# Patient Record
Sex: MALE | Race: WHITE
[De-identification: names, ages, dates, MRNs, and addresses within clinical notes are randomized per-mention and may not be internally consistent; named-entity substitution may affect disease eponyms.]

---

## 2017-01-31 NOTE — EMERGENCY ROOM VISIT NOTE
ED Visit Note


First contact with patient:  01:18


CHIEF COMPLAINT: Altered mental status from  Alcohol overdose





HISTORY OF PRESENT ILLNESS: This 21-year-old male patient presents to the 

emergency department via ambulance for evaluation of altered mental status, 

presumably from alcohol intoxication.  The patient was evidently on a local 

[place] for about one hour.  The patient did not disembark from the bus, and 

the  became concerned.  EMS was contacted, the patient now presents 

to the emergency room for evaluation.  He does admit to drinking alcohol 

tonight.  No drug use or pills.  He does not report pain or injury.





REVIEW OF SYSTEMS: Review of systems was somewhat limited secondary to patient'

s presumed alcohol intoxication status.  Review of systems was performed to the 

best of our ability and reperformed as the patient began to sober up.  All 

other systems were reviewed and are negative.





ALLERGIES: See EMR





MEDICATIONS: See EMR





PMH: No chronic medical disease





SOCIAL HISTORY: Student and lives locally





PHYSICAL EXAM


VITALS: Vitals are noted on the nurse's note and reviewed by myself.  Vital 

signs stable.


GENERAL: White male, who is in no acute distress and resting comfortably. 

Patient is visibly altered and smells of alcohol. 


HEAD: Normocephalic atraumatic.  


EARS: External ear normal. External auditory canals clear, tympanic membranes 

pearly gray without erythema or effusion bilaterally.


EYES: Pupils equal round and reactive to light and accommodation.  Conjunctivae 

without injection, sclerae without icterus.  Extraocular movements intact.  


NOSE: Patent, turbinates without inflammation or discharge.  


MOUTH: Mucous membranes moist.  Tonsils are not enlarged.  Pharynx without 

erythema, blood, vomitus, or exudate.  Uvula midline.  Airway patent.  


NECK: Supple without nuchal rigidity.  No lymphadenopathy.  Cervical spine is 

nontender.  


HEART: Regular rate and rhythm without murmurs gallops or rubs.


LUNGS: Clear to auscultation bilaterally without wheezes, rales or rhonchi.  No 

retractions or accessory muscle use.


ABDOMEN: Positive normal bowel sounds x 4.  Soft, nontender, without masses or 

organomegaly.  No guarding or rebound tenderness.


MUSCULOSKELETAL: No muscle atrophy, erythema, or edema noted.  Gross motor 

function intact to all extremities. 


NEURO: Patient was alert to person but not place or time. They appear with 

altered mental status. 


SKIN: The skin was without rashes, erythema, edema, or bruising. No Tenting of 

the skin.    





EMERGENCY DEPARTMENT COURSE: 


Physical exam and history was performed.  Nursing notes and EMR were reviewed.  

The patient appears to be altered on my examination.  I suspect this is from an 

alcohol overdose.  Conservative care measures  and  aspiration precautions were 

instituted.  The patient was placed on telemetry monitor and watched during the 

patient's stay.  The patient was placed in a prone position. 





Blood work was obtained and was reviewed.  The patient's blood alcohol level 

was 333. This appears to be the primary cause of the altered status. 





Patient was reevaluated multiple times throughout the course of their emergency 

department stay.  Over time the patient did sober up and was able to talk, walk

, and drink fluids without difficulty. The patient was felt stable for 

discharge home. The patient was given alcohol intoxication handouts.  The 

patient was discharged home in stable condition with a sober ride.





Differential diagnosis:


Etiologies such as alcohol intoxication, metabolic, infection, hypoglycemia, 

electrolyte abnormalities, cardiac sources, intracerebral event, toxicologic, 

neurologic, as well as others were entertained.








DIAGNOSIS: Acute alcohol intoxication


Current/Historical Medications


No Active Prescriptions or Reported Meds





Allergies


Coded Allergies:  


     No Known Allergies (Unverified , 1/28/17)





Vital Signs











  Date Time  Temp Pulse Resp B/P Pulse Ox O2 Delivery O2 Flow Rate FiO2


 


1/28/17 06:31  81 16 117/83 97   


 


1/28/17 05:33  111 14  95 Room Air  


 


1/28/17 05:13  68      


 


1/28/17 05:03  88 20  94   


 


1/28/17 04:33  89 19  96   


 


1/28/17 04:03  90 16  98   


 


1/28/17 03:58  84 18 120/66 97 Room Air  


 


1/28/17 03:52    114/53    


 


1/28/17 03:50  83 18  98   


 


1/28/17 03:20  92 20  97   


 


1/28/17 03:00  108 18 97/45 100 Room Air  


 


1/28/17 02:54    97/45    


 


1/28/17 02:50  105 3  93   


 


1/28/17 02:20  88 14  91   


 


1/28/17 02:15  87 16  92 Room Air  


 


1/28/17 01:58    125/103    


 


1/28/17 01:45  87 21  95   


 


1/28/17 01:35  108      


 


1/28/17 01:28    119/83    


 


1/28/17 01:26      Room Air  


 


1/28/17 01:18     96 Room Air  


 


1/28/17 01:18 37.1 115 15 153/86 96 Room Air  











Laboratory Results


1/28/17 01:36

















Test


  1/28/17


01:36


 


Anion Gap


  11.0 mmol/L


(3-11)


 


Est Creatinine Clear Calc


Drug Dose 142.2 ml/min 


 


 


Estimated GFR (


American) 131.9 


 


 


Estimated GFR (Non-


American 113.8 


 


 


BUN/Creatinine Ratio 15.9 (10-20) 


 


Calcium Level


  8.5 mg/dl


(8.5-10.1)


 


Ethyl Alcohol mg/dL


  333.0 mg/dl


(0-3)











Departure Information


Impression





 Primary Impression:  


 Alcohol intoxication





Dispostion


Home / Self-Care





Condition


GOOD





Prescriptions





No Active Prescriptions or Reported Meds





Forms


HOME CARE DOCUMENTATION FORM,                                                 

               IMPORTANT VISIT INFORMATION





Patient Instructions


Alcohol Intoxication - [facility], Replaced by [facility], [facility]: [employer] 

and Alcohol Related Visits





Additional Instructions





You were seen and evaluated today on an emergency basis only.  This is not a 

substitute for, or an effort to provide, complete comprehensive medical care.  

It is not possible to recognize and treat all injuries or illnesses in a single 

emergency department visit. 





Keep well-hydrated.  Small sips of water over a long period of time are better 

tolerated than large amounts at once.





Tylenol 1000 mg every 6 hours as needed for pain (Maximum 3000 mg Tylenol in 24 

hr period).  





Follow up with family doctor as needed.





You are welcome to return to the emergency department anytime with new, 

worsening, or concerning symptoms.

## 2018-02-15 ENCOUNTER — HOSPITAL ENCOUNTER (EMERGENCY)
Dept: HOSPITAL 45 - C.EDB | Age: 23
Discharge: HOME | End: 2018-02-15
Payer: COMMERCIAL

## 2018-02-15 VITALS — HEART RATE: 95 BPM | SYSTOLIC BLOOD PRESSURE: 126 MMHG | DIASTOLIC BLOOD PRESSURE: 84 MMHG | OXYGEN SATURATION: 100 %

## 2018-02-15 VITALS
BODY MASS INDEX: 25.25 KG/M2 | HEIGHT: 75 IN | WEIGHT: 203.05 LBS | WEIGHT: 203.05 LBS | HEIGHT: 75 IN | BODY MASS INDEX: 25.25 KG/M2

## 2018-02-15 VITALS — TEMPERATURE: 98.96 F

## 2018-02-15 DIAGNOSIS — R07.2: Primary | ICD-10-CM

## 2018-02-15 LAB
ALBUMIN SERPL-MCNC: 4.5 GM/DL (ref 3.4–5)
ALP SERPL-CCNC: 85 U/L (ref 45–117)
ALT SERPL-CCNC: 23 U/L (ref 12–78)
AST SERPL-CCNC: 17 U/L (ref 15–37)
BASOPHILS # BLD: 0.02 K/UL (ref 0–0.2)
BASOPHILS NFR BLD: 0.2 %
BUN SERPL-MCNC: 17 MG/DL (ref 7–18)
CALCIUM SERPL-MCNC: 9.2 MG/DL (ref 8.5–10.1)
CO2 SERPL-SCNC: 26 MMOL/L (ref 21–32)
CREAT SERPL-MCNC: 1.08 MG/DL (ref 0.6–1.4)
EOS ABS #: 0.02 K/UL (ref 0–0.5)
EOSINOPHIL NFR BLD AUTO: 214 K/UL (ref 130–400)
GLUCOSE SERPL-MCNC: 94 MG/DL (ref 70–99)
HCT VFR BLD CALC: 42.9 % (ref 42–52)
HGB BLD-MCNC: 15.3 G/DL (ref 14–18)
IG#: 0.03 K/UL (ref 0–0.02)
IMM GRANULOCYTES NFR BLD AUTO: 13.9 %
LYMPHOCYTES # BLD: 1.25 K/UL (ref 1.2–3.4)
MCH RBC QN AUTO: 31.9 PG (ref 25–34)
MCHC RBC AUTO-ENTMCNC: 35.7 G/DL (ref 32–36)
MCV RBC AUTO: 89.4 FL (ref 80–100)
MONO ABS #: 0.82 K/UL (ref 0.11–0.59)
MONOCYTES NFR BLD: 9.1 %
NEUT ABS #: 6.83 K/UL (ref 1.4–6.5)
NEUTROPHILS # BLD AUTO: 0.2 %
NEUTROPHILS NFR BLD AUTO: 76.3 %
PMV BLD AUTO: 9.7 FL (ref 7.4–10.4)
POTASSIUM SERPL-SCNC: 4 MMOL/L (ref 3.5–5.1)
PROT SERPL-MCNC: 8 GM/DL (ref 6.4–8.2)
RED CELL DISTRIBUTION WIDTH CV: 12.3 % (ref 11.5–14.5)
RED CELL DISTRIBUTION WIDTH SD: 40 FL (ref 36.4–46.3)
SODIUM SERPL-SCNC: 137 MMOL/L (ref 136–145)
WBC # BLD AUTO: 8.97 K/UL (ref 4.8–10.8)

## 2018-02-15 NOTE — EMERGENCY ROOM VISIT NOTE
History


First contact with patient:  20:48


Chief Complaint:  CHEST PAIN


Stated Complaint:  CHEST PAINS


Nursing Triage Summary:  


Patient ambulatory to triage with an upright and steady gait, states "I woke up 


this morning and my chest hurt. It hurt to breathe deep. We are traveling to 


[facility] right now. We were passing through here and stopping to get food but my 


chest is hurting really bad. I get a really sharp pain in the center of my 


chest, toward the right, when I take a deep breath. My right shoulder was 


hurting but it isn't right now."











Patient reports traveling in a car for about 3.5 hours so far.





History of Present Illness


The patient is a 22 year old male who presents to the Emergency Room with 

complaints of chest pain that started this morning.  The patient states that he 

developed pressure-like pain in the middle of his chest from the time he woke 

up this morning and has gotten worse.  Pain is worse on deep inspiration with 

intermittent radiation to his right shoulder.


Pain is about a 5/10 at rest and 8/10 on deep inspiration.  Denies any fevers 

or chills, coughing.  Denies any shortness of breath, palpitations, 

lightheadedness or dizziness.  Denies any history of asthma, denies smoking, 

alcohol use or illicit drug use.  No significant family history of heart 

disease or PE/DVT.


He has been traveling to [place] and has been on the road for about 3-1/2 

hours.  Denies any calf tenderness or swelling, denies any long haul travels, 

recent immobilization.


He lifts weights at gym but hasn't changed his regimen recently





   Source of History:  patient


   Symptom Intensity:  moderate


   Quality:  stabbing


   Timing:  intermittent


   Modifying Factors (Worsening):  breathing


   Associated Symptoms:  No fevers, No chills, No headache, No sorethroat, No 

cough, No nausea, No vomiting, No abdominal pain





Review of Systems


See HPI for pertinent positives & negatives. A total of 10 systems reviewed and 

were otherwise negative.





Past Medical/Surgical History


none





Social History


Smoking Status:  Never Smoker





Current/Historical Medications


No Active Prescriptions or Reported Meds





Physical Exam


Vital Signs











  Date Time  Temp Pulse Resp B/P (MAP) Pulse Ox O2 Delivery O2 Flow Rate FiO2


 


2/15/18 21:01  99      


 


2/15/18 20:46  107 18 137/87 100 Room Air  


 


2/15/18 18:16     98   


 


2/15/18 18:12 37.2 100 16 122/61 97 Room Air  











Physical Exam


GENERAL: Patient is in no acute distress.


HEENT: No acute trauma, normocephalic atraumatic, mucous membranes moist, no 

nasal congestion, no scleral icterus.


NECK: No stridor, no adenopathy, no meningismus, trachea is midline.


LUNGS: Clear to auscultation bilaterally, no wheeze, no rhonchi, breath sounds 

equal.


HEART: Without murmurs gallops or rubs, regular rate and rhythm.


ABDOMEN: Soft, nontender, bowel sounds positive, no hernias, no peritonitis.


EXTREMITIES: No cyanosis or edema, full range of motion of all the joints 

without pain or difficulty, no signs for acute trauma.


NEUROLOGIC: Oriented x 3, no acute motor or sensory deficits, no focal weakness.


SKIN: No rash, no jaundice, no diaphoresis.





Medical Decision & Procedures


Laboratory Results


2/15/18 20:51








Red Blood Count 4.80, Mean Corpuscular Volume 89.4, Mean Corpuscular Hemoglobin 

31.9, Mean Corpuscular Hemoglobin Concent 35.7, Mean Platelet Volume 9.7, 

Neutrophils (%) (Auto) 76.3, Lymphocytes (%) (Auto) 13.9, Monocytes (%) (Auto) 

9.1, Eosinophils (%) (Auto) 0.2, Basophils (%) (Auto) 0.2, Neutrophils # (Auto) 

6.83, Lymphocytes # (Auto) 1.25, Monocytes # (Auto) 0.82, Eosinophils # (Auto) 

0.02, Basophils # (Auto) 0.02





2/15/18 20:51

















Test


  2/15/18


20:51 2/15/18


21:14


 


White Blood Count


  8.97 K/uL


(4.8-10.8) 


 


 


Red Blood Count


  4.80 M/uL


(4.7-6.1) 


 


 


Hemoglobin


  15.3 g/dL


(14.0-18.0) 


 


 


Hematocrit 42.9 % (42-52)  


 


Mean Corpuscular Volume


  89.4 fL


() 


 


 


Mean Corpuscular Hemoglobin


  31.9 pg


(25-34) 


 


 


Mean Corpuscular Hemoglobin


Concent 35.7 g/dl


(32-36) 


 


 


Platelet Count


  214 K/uL


(130-400) 


 


 


Mean Platelet Volume


  9.7 fL


(7.4-10.4) 


 


 


Neutrophils (%) (Auto) 76.3 %  


 


Lymphocytes (%) (Auto) 13.9 %  


 


Monocytes (%) (Auto) 9.1 %  


 


Eosinophils (%) (Auto) 0.2 %  


 


Basophils (%) (Auto) 0.2 %  


 


Neutrophils # (Auto)


  6.83 K/uL


(1.4-6.5) 


 


 


Lymphocytes # (Auto)


  1.25 K/uL


(1.2-3.4) 


 


 


Monocytes # (Auto)


  0.82 K/uL


(0.11-0.59) 


 


 


Eosinophils # (Auto)


  0.02 K/uL


(0-0.5) 


 


 


Basophils # (Auto)


  0.02 K/uL


(0-0.2) 


 


 


RDW Standard Deviation


  40.0 fL


(36.4-46.3) 


 


 


RDW Coefficient of Variation


  12.3 %


(11.5-14.5) 


 


 


Immature Granulocyte % (Auto) 0.3 %  


 


Immature Granulocyte # (Auto)


  0.03 K/uL


(0.00-0.02) 


 


 


Anion Gap


  9.0 mmol/L


(3-11) 


 


 


Est Creatinine Clear Calc


Drug Dose 126.1 ml/min 


  


 


 


Estimated GFR (


American) 110.8 


  


 


 


Estimated GFR (Non-


American 95.6 


  


 


 


BUN/Creatinine Ratio 15.7 (10-20)  


 


Calcium Level


  9.2 mg/dl


(8.5-10.1) 


 


 


Total Bilirubin


  0.8 mg/dl


(0.2-1) 


 


 


Aspartate Amino Transf


(AST/SGOT) 17 U/L (15-37) 


  


 


 


Alanine Aminotransferase


(ALT/SGPT) 23 U/L (12-78) 


  


 


 


Alkaline Phosphatase


  85 U/L


() 


 


 


Troponin I


  < 0.015 ng/ml


(0-0.045) 


 


 


Total Protein


  8.0 gm/dl


(6.4-8.2) 


 


 


Albumin


  4.5 gm/dl


(3.4-5.0) 


 


 


Globulin


  3.5 gm/dl


(2.5-4.0) 


 


 


Albumin/Globulin Ratio 1.3 (0.9-2)  


 


Bedside D-Dimer


  


  65 ng/mlFEU


(0-450)











ECG Per My Interpretation


Indication:  chest pain


Rhythm:  normal sinus


Findings:  no acute ischemic change





Medical Decision


Prior records/ancillary studies reviewed.


Triage Nursing notes reviewed.


Additional history obtained from the patient.


The patient's history was concerning for chest pain. 





Differential diagnosis:


Etiologies such as cardiac ischemia, aortic dissection, pulmonary embolism, 

pneumonia, pneumothorax, musculoskeletal, infections, pericarditis, myocarditis

, esophageal rupture, gastrointestinal, as well as others were entertained. 





Physical examination:


As above.





ER treatment provided:


CBC, CMP, troponin, CXR and D-dimer were ordered


On reassessment the patient felt better.





Diagnostic interpretation by me:


The electrocardiogram was negative for pathologic change. 





The labs were unremarkable 





Imaging studies:


Chest x-ray as above





By the evaluation outlined above emergent etiologies such as cardiac ischemia, 

aortic dissection, pulmonary embolism, pneumonia, pneumothorax, infections, 

pericarditis, myocarditis, gastrointestinal, as well as others were deemed 

relatively unlikely. 





The patient was informed about the findings as listed above. All questions were 

answered and he was pleased with the treatment. Return instructions were 

outlined and the patient was discharged in stable condition. 





Outpatient prescription management:


ibuprofen 800 mg TID 





Referral:


The patient was referred back to his primary care physician for follow-up in 2 

to 3 days for a recheck of the current condition.


 22-year-old male with no significant past medical history presented with 

precordial chest pain that started this morning worse with deep inspiration.  

Vital signs revealed tachycardia but were otherwise stable. EKG was 

unremarkable and labs were unremarkable.  Chest x-ray was negative.


It is likely his chest pain is secondary to costochondritis and he was given 30 

mg IV Toradol for pain relief.  He was recommended to use 800 mg ibuprofen 

every 8 hours as needed .


He was discharged in stable condition and was commenced follow up with his PCP 

in the next few days or return to the ER if he develops worsening chest pain, 

shortness of breath or palpitations.





Impression





 Primary Impression:  


 Substernal precordial chest pain





Departure Information


Referrals


No Doctor, Assigned (PCP)





Patient Instructions


My [facility]





Resident Tracking


Resident Involvement:  Resident Care Provided


Care Provided:  Adult ED

## 2018-02-15 NOTE — DIAGNOSTIC IMAGING REPORT
CHEST 2 VIEWS ROUTINE



HISTORY: Mid sternal  Chest pain with deep breathing



COMPARISON: None.



FINDINGS: The lungs are clear. Cardiac silhouette is normal in size. No pleural

effusions. No pneumothorax.



IMPRESSION:

No acute process.







Electronically signed by:  Lance Carey M.D.

2/15/2018 10:26 PM



Dictated Date/Time:  2/15/2018 10:25 PM

## 2018-02-15 NOTE — EMERGENCY ROOM VISIT NOTE
History


Report prepared by Cruz:  Kaleb Rodas


Under the Supervision of:  Dr. Javier Hickey D.O.


First contact with patient:  20:48


Chief Complaint:  CHEST PAIN


Stated Complaint:  CHEST PAINS


Nursing Triage Summary:  


Patient ambulatory to triage with an upright and steady gait, states "I woke up 


this morning and my chest hurt. It hurt to breathe deep. We are traveling to 


[facility] right now. We were passing through here and stopping to get food but my 


chest is hurting really bad. I get a really sharp pain in the center of my 


chest, toward the right, when I take a deep breath. My right shoulder was 


hurting but it isn't right now."











Patient reports traveling in a car for about 3.5 hours so far.





History of Present Illness


The patient is a 22 year old male who presents to the Emergency Room with 

complaints of constant middle chest pain beginning this morning. The patient 

states that his pain worsens when he takes a deep breath, and that it 

occasionally radiates to his right shoulder. He denies any fever, SOB, and 

cough. He notes that he has not history of asthma and has no family history of 

DVT or heart disease. He rates his pain as a 5/10 normally, and as an 8/10 when 

he takes a deep breath.





   Source of History:  patient


   Onset:  this morning


   Position:  chest


   Symptom Intensity:  5/10 normally, 8/10 when he takes a deep breath


   Timing:  constant


   Modifying Factors (Worsening):  breathing (deep breathing)


   Associated Symptoms:  + fevers, No cough, No SOB


Note:


He also complains of right shoulder pain.





Review of Systems


See HPI for pertinent positives & negatives. A total of 10 systems reviewed and 

were otherwise negative.





Past Medical & Surgical


Medical Problems:


(1) No chronic problems








Family History


No pertinent family history stated.





Social History


Smoking Status:  Never Smoker


Marital Status:  single


Occupation Status:  employed





Current/Historical Medications


Scheduled PRN


Ibuprofen Tab (Advil), 400 MG PO TID PRN for Pain





Allergies


Coded Allergies:  


     No Known Allergies (Unverified , 2/15/18)





Physical Exam


Vital Signs











  Date Time  Temp Pulse Resp B/P (MAP) Pulse Ox O2 Delivery O2 Flow Rate FiO2


 


2/15/18 22:24  95 18 126/84 100   


 


2/15/18 21:01  99      


 


2/15/18 20:46  107 18 137/87 100 Room Air  


 


2/15/18 18:16     98   


 


2/15/18 18:12 37.2 100 16 122/61 97 Room Air  











Physical Exam


CONSTITUTIONAL/VITAL SIGNS: Reviewed / noted above.


GENERAL: Non-toxic in appearance. 


INTEGUMENTARY: Warm, dry, and Pink.


HEAD: Normocephalic.


EYES: without scleral icterus or trauma.


ENT/OROPHARYNX: clear and moist.


LYMPHADENOPATHY/NECK: Is supple without lymphadenopathy or meningismus.


RESPIRATORY: Lungs clear and equal.


CARDIOVASCULAR: Regular rate and rhythm.


GI/ABDOMEN: Soft and nontender. No organomegaly or pulsatile mass. No rebound 

or guarding. Normal bowel sounds.


EXTREMITIES: Warm and well perfused.


BACK: No CVA tenderness.


NEUROLOGICAL: Intact without focal deficits. 


PSYCHIATRIC: normal affect.


MUSCULOSKELETAL: Normally developed with good muscle tone.





Medical Decision & Procedures


ER Provider


Diagnostic Interpretation:


Radiology results as stated below per my review and radiologist interpretation:





CHEST 2 VIEWS ROUTINE





FINDINGS: The lungs are clear. Cardiac silhouette is normal in size. No pleural


effusions. No pneumothorax.





IMPRESSION:


No acute process.





Electronically signed by:  Lance Carey M.D.


2/15/2018 10:26 PM





Laboratory Results


2/15/18 20:51








Red Blood Count 4.80, Mean Corpuscular Volume 89.4, Mean Corpuscular Hemoglobin 

31.9, Mean Corpuscular Hemoglobin Concent 35.7, Mean Platelet Volume 9.7, 

Neutrophils (%) (Auto) 76.3, Lymphocytes (%) (Auto) 13.9, Monocytes (%) (Auto) 

9.1, Eosinophils (%) (Auto) 0.2, Basophils (%) (Auto) 0.2, Neutrophils # (Auto) 

6.83, Lymphocytes # (Auto) 1.25, Monocytes # (Auto) 0.82, Eosinophils # (Auto) 

0.02, Basophils # (Auto) 0.02





2/15/18 20:51

















Test


  2/15/18


20:51 2/15/18


21:14


 


White Blood Count


  8.97 K/uL


(4.8-10.8) 


 


 


Red Blood Count


  4.80 M/uL


(4.7-6.1) 


 


 


Hemoglobin


  15.3 g/dL


(14.0-18.0) 


 


 


Hematocrit 42.9 % (42-52)  


 


Mean Corpuscular Volume


  89.4 fL


() 


 


 


Mean Corpuscular Hemoglobin


  31.9 pg


(25-34) 


 


 


Mean Corpuscular Hemoglobin


Concent 35.7 g/dl


(32-36) 


 


 


Platelet Count


  214 K/uL


(130-400) 


 


 


Mean Platelet Volume


  9.7 fL


(7.4-10.4) 


 


 


Neutrophils (%) (Auto) 76.3 %  


 


Lymphocytes (%) (Auto) 13.9 %  


 


Monocytes (%) (Auto) 9.1 %  


 


Eosinophils (%) (Auto) 0.2 %  


 


Basophils (%) (Auto) 0.2 %  


 


Neutrophils # (Auto)


  6.83 K/uL


(1.4-6.5) 


 


 


Lymphocytes # (Auto)


  1.25 K/uL


(1.2-3.4) 


 


 


Monocytes # (Auto)


  0.82 K/uL


(0.11-0.59) 


 


 


Eosinophils # (Auto)


  0.02 K/uL


(0-0.5) 


 


 


Basophils # (Auto)


  0.02 K/uL


(0-0.2) 


 


 


RDW Standard Deviation


  40.0 fL


(36.4-46.3) 


 


 


RDW Coefficient of Variation


  12.3 %


(11.5-14.5) 


 


 


Immature Granulocyte % (Auto) 0.3 %  


 


Immature Granulocyte # (Auto)


  0.03 K/uL


(0.00-0.02) 


 


 


Anion Gap


  9.0 mmol/L


(3-11) 


 


 


Est Creatinine Clear Calc


Drug Dose 126.1 ml/min 


  


 


 


Estimated GFR (


American) 110.8 


  


 


 


Estimated GFR (Non-


American 95.6 


  


 


 


BUN/Creatinine Ratio 15.7 (10-20)  


 


Calcium Level


  9.2 mg/dl


(8.5-10.1) 


 


 


Total Bilirubin


  0.8 mg/dl


(0.2-1) 


 


 


Aspartate Amino Transf


(AST/SGOT) 17 U/L (15-37) 


  


 


 


Alanine Aminotransferase


(ALT/SGPT) 23 U/L (12-78) 


  


 


 


Alkaline Phosphatase


  85 U/L


() 


 


 


Troponin I


  < 0.015 ng/ml


(0-0.045) 


 


 


Total Protein


  8.0 gm/dl


(6.4-8.2) 


 


 


Albumin


  4.5 gm/dl


(3.4-5.0) 


 


 


Globulin


  3.5 gm/dl


(2.5-4.0) 


 


 


Albumin/Globulin Ratio 1.3 (0.9-2)  


 


Bedside D-Dimer


  


  65 ng/mlFEU


(0-450)





Laboratory results as stated above per my review.





Medications Administered











 Medications


  (Trade)  Dose


 Ordered  Sig/Jo Ann


 Route  Start Time


 Stop Time Status Last Admin


Dose Admin


 


 Ketorolac


 Tromethamine


  (Toradol Inj)  30 mg  NOW  STAT


 IV  2/15/18 21:40


 2/15/18 21:42 DC 2/15/18 21:52


30 MG











ED Course


2135: Previous medical records were reviewed. The patient was evaluated in room 

C9. A complete history and physical examination was performed.





2140: Toradol Inj 30mg IV





2208: On reevaluation, the patient is stable. I discussed the results and 

findings with the patient. He verbalized agreement of the treatment plan. The 

patient was discharged home.





Medical Decision


the differential was considered includes acute myocardial infarction, acute 

coronary syndrome, myocarditis, pericarditis, pericardial effusions /tamponade, 

esophageal perforation, thoracic aortic dissection, pulmonary embolism, 

pneumonia, pneumothorax, pancreatitis, shingles, acute cholecystitis, 

perforated abdominal viscus.





This is a 22-year-old male who presents to the ED with a chief complaint of 

chest pain.  The patient states that he awoke this morning with the pain.  He 

states it seems to be worse with a deep breath.  It seemed to get worse prior 

to his arrival here.  The patient's vital signs here were normal other than a 

slight tachycardia.  His physical exam was unremarkable.  He does report 

lifting weights recently.  The patient's CBC was normal, d-dimer was negative, 

troponin was negative, complete metabolic panel was normal and an EKG shows a 

normal sinus rhythm without ischemic changes.  The patient was told the results 

of the test.  He is felt to be stable for discharge.  The patient was seen in 

coordination with the resident.





Blood Pressure Screening


Patient's blood pressure:  Elevated blood pressure


Blood pressure disposition:  Elevated BP felt to be situational





Impression





 Primary Impression:  


 Precordial chest pain





Scribe Attestation


The scribe's documentation has been prepared under my direction and personally 

reviewed by me in its entirety. I confirm that the note above accurately 

reflects all work, treatment, procedures, and medical decision making performed 

by me.





Departure Information


Dispostion


Home / Self-Care





Forms


HOME CARE DOCUMENTATION FORM,                                                 

               IMPORTANT VISIT INFORMATION





Patient Instructions


My [facility]





Additional Instructions





You have been examined and treated today on an emergency basis only. This is 

not a substitute for, or an effort to provide, complete comprehensive medical 

care. It is impossible to recognize and treat all injuries or illnesses in a 

single emergency department visit. It is therefore important that you follow up 

closely with your physician.  Call as soon as possible for an appointment.  

Return for worsening symptoms or if you develop any shortness of breath, chest 

pain, palpitations or any other concerning symptoms.


_ You may use ibuprofen 800 mg three times/day as needed for pain relief


- Please follow with your PCP in the next 2 few days